# Patient Record
Sex: MALE | Race: WHITE | Employment: UNEMPLOYED | URBAN - METROPOLITAN AREA
[De-identification: names, ages, dates, MRNs, and addresses within clinical notes are randomized per-mention and may not be internally consistent; named-entity substitution may affect disease eponyms.]

---

## 2017-09-19 ENCOUNTER — APPOINTMENT (OUTPATIENT)
Dept: GENERAL RADIOLOGY | Age: 27
End: 2017-09-19
Payer: COMMERCIAL

## 2017-09-19 ENCOUNTER — HOSPITAL ENCOUNTER (EMERGENCY)
Age: 27
Discharge: HOME | End: 2017-09-19
Payer: COMMERCIAL

## 2017-09-19 VITALS
RESPIRATION RATE: 18 BRPM | HEART RATE: 78 BPM | TEMPERATURE: 98.9 F | WEIGHT: 145 LBS | DIASTOLIC BLOOD PRESSURE: 66 MMHG | HEIGHT: 69 IN | OXYGEN SATURATION: 99 % | SYSTOLIC BLOOD PRESSURE: 120 MMHG | BODY MASS INDEX: 21.48 KG/M2

## 2017-09-19 DIAGNOSIS — K62.5 RECTAL BLEEDING: Primary | ICD-10-CM

## 2017-09-19 LAB
ALBUMIN SERPL-MCNC: 4.2 G/DL (ref 3.9–4.9)
ALP BLD-CCNC: 214 U/L (ref 35–104)
ALT SERPL-CCNC: 38 U/L (ref 0–41)
ANION GAP SERPL CALCULATED.3IONS-SCNC: 12 MEQ/L (ref 7–13)
APTT: 28.4 SEC (ref 21.6–35.4)
AST SERPL-CCNC: 28 U/L (ref 0–40)
BASOPHILS ABSOLUTE: 0.1 K/UL (ref 0–0.2)
BASOPHILS RELATIVE PERCENT: 1.3 %
BILIRUB SERPL-MCNC: 0.2 MG/DL (ref 0–1.2)
BUN BLDV-MCNC: 14 MG/DL (ref 6–20)
CALCIUM SERPL-MCNC: 9.1 MG/DL (ref 8.6–10.2)
CHLORIDE BLD-SCNC: 98 MEQ/L (ref 98–107)
CO2: 27 MEQ/L (ref 22–29)
CREAT SERPL-MCNC: 0.71 MG/DL (ref 0.7–1.2)
EOSINOPHILS ABSOLUTE: 0.1 K/UL (ref 0–0.7)
EOSINOPHILS RELATIVE PERCENT: 2 %
GFR AFRICAN AMERICAN: >60
GFR NON-AFRICAN AMERICAN: >60
GLOBULIN: 3.5 G/DL (ref 2.3–3.5)
GLUCOSE BLD-MCNC: 95 MG/DL (ref 74–109)
HCT VFR BLD CALC: 38.7 % (ref 42–52)
HEMOGLOBIN: 12.8 G/DL (ref 14–18)
INR BLD: 1
LYMPHOCYTES ABSOLUTE: 1.1 K/UL (ref 1–4.8)
LYMPHOCYTES RELATIVE PERCENT: 16.1 %
MCH RBC QN AUTO: 26.3 PG (ref 27–31.3)
MCHC RBC AUTO-ENTMCNC: 33.2 % (ref 33–37)
MCV RBC AUTO: 79.2 FL (ref 80–100)
MONOCYTES ABSOLUTE: 0.5 K/UL (ref 0.2–0.8)
MONOCYTES RELATIVE PERCENT: 7.5 %
NEUTROPHILS ABSOLUTE: 4.9 K/UL (ref 1.4–6.5)
NEUTROPHILS RELATIVE PERCENT: 73.1 %
PDW BLD-RTO: 14.3 % (ref 11.5–14.5)
PLATELET # BLD: 303 K/UL (ref 130–400)
POTASSIUM SERPL-SCNC: 4.7 MEQ/L (ref 3.5–5.1)
PROTHROMBIN TIME: 10.3 SEC (ref 8.1–13.7)
RBC # BLD: 4.88 M/UL (ref 4.7–6.1)
SODIUM BLD-SCNC: 137 MEQ/L (ref 132–144)
TOTAL PROTEIN: 7.7 G/DL (ref 6.4–8.1)
WBC # BLD: 6.7 K/UL (ref 4.8–10.8)

## 2017-09-19 PROCEDURE — S0028 INJECTION, FAMOTIDINE, 20 MG: HCPCS | Performed by: PHYSICIAN ASSISTANT

## 2017-09-19 PROCEDURE — 85025 COMPLETE CBC W/AUTO DIFF WBC: CPT

## 2017-09-19 PROCEDURE — C9113 INJ PANTOPRAZOLE SODIUM, VIA: HCPCS | Performed by: PHYSICIAN ASSISTANT

## 2017-09-19 PROCEDURE — 80053 COMPREHEN METABOLIC PANEL: CPT

## 2017-09-19 PROCEDURE — 74000 XR ABDOMEN LIMITED (KUB): CPT

## 2017-09-19 PROCEDURE — 85730 THROMBOPLASTIN TIME PARTIAL: CPT

## 2017-09-19 PROCEDURE — 96374 THER/PROPH/DIAG INJ IV PUSH: CPT

## 2017-09-19 PROCEDURE — 99283 EMERGENCY DEPT VISIT LOW MDM: CPT

## 2017-09-19 PROCEDURE — 2580000003 HC RX 258: Performed by: PHYSICIAN ASSISTANT

## 2017-09-19 PROCEDURE — 85610 PROTHROMBIN TIME: CPT

## 2017-09-19 PROCEDURE — 2500000003 HC RX 250 WO HCPCS: Performed by: PHYSICIAN ASSISTANT

## 2017-09-19 PROCEDURE — 36415 COLL VENOUS BLD VENIPUNCTURE: CPT

## 2017-09-19 PROCEDURE — 96375 TX/PRO/DX INJ NEW DRUG ADDON: CPT

## 2017-09-19 PROCEDURE — 6360000002 HC RX W HCPCS: Performed by: PHYSICIAN ASSISTANT

## 2017-09-19 RX ORDER — TRAZODONE HYDROCHLORIDE 100 MG/1
50 TABLET ORAL NIGHTLY
COMMUNITY

## 2017-09-19 RX ORDER — LISINOPRIL 10 MG/1
10 TABLET ORAL DAILY
COMMUNITY

## 2017-09-19 RX ORDER — FEXOFENADINE HCL 180 MG/1
180 TABLET ORAL DAILY
COMMUNITY

## 2017-09-19 RX ORDER — FELBAMATE 400 MG/1
400 TABLET ORAL 3 TIMES DAILY
COMMUNITY

## 2017-09-19 RX ORDER — 0.9 % SODIUM CHLORIDE 0.9 %
10 VIAL (ML) INJECTION ONCE
Status: COMPLETED | OUTPATIENT
Start: 2017-09-19 | End: 2017-09-19

## 2017-09-19 RX ORDER — PANTOPRAZOLE SODIUM 40 MG/10ML
40 INJECTION, POWDER, LYOPHILIZED, FOR SOLUTION INTRAVENOUS ONCE
Status: COMPLETED | OUTPATIENT
Start: 2017-09-19 | End: 2017-09-19

## 2017-09-19 RX ORDER — ZIPRASIDONE HYDROCHLORIDE 80 MG/1
80 CAPSULE ORAL 2 TIMES DAILY WITH MEALS
COMMUNITY

## 2017-09-19 RX ORDER — LACOSAMIDE 150 MG/1
150 TABLET ORAL 2 TIMES DAILY
COMMUNITY

## 2017-09-19 RX ORDER — 0.9 % SODIUM CHLORIDE 0.9 %
1000 INTRAVENOUS SOLUTION INTRAVENOUS ONCE
Status: COMPLETED | OUTPATIENT
Start: 2017-09-19 | End: 2017-09-19

## 2017-09-19 RX ADMIN — SODIUM CHLORIDE, PRESERVATIVE FREE 10 ML: 5 INJECTION INTRAVENOUS at 14:35

## 2017-09-19 RX ADMIN — FAMOTIDINE 20 MG: 10 INJECTION, SOLUTION INTRAVENOUS at 14:35

## 2017-09-19 RX ADMIN — SODIUM CHLORIDE 1000 ML: 9 INJECTION, SOLUTION INTRAVENOUS at 14:36

## 2017-09-19 RX ADMIN — PANTOPRAZOLE SODIUM 40 MG: 40 INJECTION, POWDER, FOR SOLUTION INTRAVENOUS at 14:35

## 2017-09-19 ASSESSMENT — ENCOUNTER SYMPTOMS
BLOOD IN STOOL: 1
SHORTNESS OF BREATH: 0
COUGH: 0
DIARRHEA: 0
NAUSEA: 0
VOMITING: 0
ABDOMINAL PAIN: 0

## 2017-10-24 ENCOUNTER — APPOINTMENT (OUTPATIENT)
Dept: CT IMAGING | Age: 27
End: 2017-10-24
Payer: COMMERCIAL

## 2017-10-24 ENCOUNTER — HOSPITAL ENCOUNTER (EMERGENCY)
Age: 27
Discharge: HOME | End: 2017-10-24
Payer: COMMERCIAL

## 2017-10-24 VITALS
RESPIRATION RATE: 18 BRPM | TEMPERATURE: 98.5 F | OXYGEN SATURATION: 95 % | HEART RATE: 80 BPM | HEIGHT: 69 IN | WEIGHT: 147 LBS | DIASTOLIC BLOOD PRESSURE: 83 MMHG | SYSTOLIC BLOOD PRESSURE: 132 MMHG | BODY MASS INDEX: 21.77 KG/M2

## 2017-10-24 DIAGNOSIS — S01.01XA LACERATION OF SCALP, INITIAL ENCOUNTER: Primary | ICD-10-CM

## 2017-10-24 DIAGNOSIS — R56.9 SEIZURE (HCC): ICD-10-CM

## 2017-10-24 DIAGNOSIS — S09.90XA CLOSED HEAD INJURY, INITIAL ENCOUNTER: ICD-10-CM

## 2017-10-24 PROCEDURE — 12002 RPR S/N/AX/GEN/TRNK2.6-7.5CM: CPT

## 2017-10-24 PROCEDURE — 70450 CT HEAD/BRAIN W/O DYE: CPT

## 2017-10-24 PROCEDURE — 99284 EMERGENCY DEPT VISIT MOD MDM: CPT

## 2017-10-24 PROCEDURE — 72125 CT NECK SPINE W/O DYE: CPT

## 2017-10-24 RX ORDER — PANTOPRAZOLE SODIUM 20 MG/1
40 TABLET, DELAYED RELEASE ORAL DAILY
COMMUNITY

## 2017-10-24 RX ORDER — EVEROLIMUS 10 MG/1
10 TABLET ORAL DAILY
COMMUNITY

## 2017-10-24 ASSESSMENT — ENCOUNTER SYMPTOMS
BLOOD IN STOOL: 0
RHINORRHEA: 0
ABDOMINAL PAIN: 0
SHORTNESS OF BREATH: 0
DIARRHEA: 0
COLOR CHANGE: 0
NAUSEA: 0
VOMITING: 0
COUGH: 0

## 2017-10-24 NOTE — ED TRIAGE NOTES
Father states pt had a seizure falling and hitting the back of his head causing a approx 3cm laceration to the back of his head bleeding controlled, pt is alert, breathes are equal and unlabored, no distress noted,

## 2017-10-24 NOTE — ED PROVIDER NOTES
[facility])          SURGICAL HISTORY       Past Surgical History:   Procedure Laterality Date    CIRCUMCISION      FACIAL COSMETIC SURGERY      FOREIGN BODY REMOVAL      from nose         CURRENT MEDICATIONS       Previous Medications    EVEROLIMUS (AFINITOR) 10 MG TABS CHEMO TABLET    Take 10 mg by mouth daily    FELBAMATE (FELBATOL) 400 MG TABLET    Take 400 mg by mouth 3 times daily    FEXOFENADINE (ALLEGRA ALLERGY) 180 MG TABLET    Take 180 mg by mouth daily    LACOSAMIDE (VIMPAT) 150 MG TABS TABLET    Take 150 mg by mouth 2 times daily    LAMOTRIGINE (LAMICTAL PO)    Take 300 mg by mouth nightly    LISDEXAMFETAMINE (VYVANSE) 30 MG CAPSULE    Take 30 mg by mouth every morning . LISINOPRIL (PRINIVIL;ZESTRIL) 10 MG TABLET    Take 10 mg by mouth daily    PANTOPRAZOLE (PROTONIX) 20 MG TABLET    Take 40 mg by mouth daily    TRAZODONE (DESYREL) 100 MG TABLET    Take 50 mg by mouth nightly     ZIPRASIDONE (GEODON) 80 MG CAPSULE    Take 80 mg by mouth 2 times daily (with meals)       ALLERGIES     Review of patient's allergies indicates no known allergies. FAMILY HISTORY     History reviewed. No pertinent family history. SOCIAL HISTORY       Social History     Social History    Marital status: Single     Spouse name: N/A    Number of children: N/A    Years of education: N/A     Social History Main Topics    Smoking status: Never Smoker    Smokeless tobacco: Never Used    Alcohol use No    Drug use: No    Sexual activity: Not Asked     Other Topics Concern    None     Social History Narrative    None         PHYSICAL EXAM         ED Triage Vitals [10/24/17 1838]   BP Temp Temp Source Pulse Resp SpO2 Height Weight   (!) 142/86 98.5 °F (36.9 °C) Temporal 102 18 96 % 5' 9\" (1.753 m) 147 lb (66.7 kg)       Physical Exam   Constitutional: He appears well-developed and well-nourished. HENT:   Head: Normocephalic.        Right Ear: External ear normal.   Left Ear: External ear normal.   Mouth/Throat: Oropharynx is clear and moist.   Eyes: Conjunctivae are normal. Pupils are equal, round, and reactive to light. Neck: Normal range of motion. Neck supple. No tracheal deviation present. Cardiovascular: Normal heart sounds and intact distal pulses. Pulmonary/Chest: Effort normal and breath sounds normal. No stridor. No respiratory distress. Abdominal: Soft. Bowel sounds are normal. He exhibits no distension and no mass. There is no tenderness. There is no rebound and no guarding. Musculoskeletal: Normal range of motion. Neurological: He is alert. He has normal reflexes. Pt does not follow commands but caregiver states normal. Pt alert looking around room with PERRL. No nystagmus. PORTILLO voluntarily well without difficulty   Skin: Skin is warm and dry. No rash noted. Psychiatric: He has a normal mood and affect. His behavior is normal. Judgment and thought content normal.       DIAGNOSTIC RESULTS     EKG: All EKG's are interpreted by the Emergency Department Physician who either signs or Co-signs this chart in the absence of a cardiologist.        RADIOLOGY:   Non-plain film images such as CT, Ultrasound and MRI are read by the radiologist. Plain radiographic images are visualized and preliminarily interpreted by the emergency physician with the below findings:    Interpretation per the Radiologist below, if available at the time of this note:    CT CERVICAL SPINE WO CONTRAST   Final Result   NEGATIVE CT SCAN OF [address] [place] [address]. All CT scans at this facility use dose modulation, iterative reconstruction, and/or weight based dosing when appropriate to reduce radiation dose to as low as reasonably achievable. CT Head WO Contrast   Final Result   1. CT FINDINGS OF TUBEROUS SCLEROSIS APPEAR UNCHANGED SINCE PRIOR CT BRAIN SCANS. 2. NO ACUTE INTRA-AXIAL OR EXTRA-AXIAL FINDINGS IN THE BRAIN. 3. NO SIGNIFICANT CHANGE SINCE THE 12/4/2012 CT BRAIN.       All CT scans at this facility use dose modulation, iterative reconstruction, and/or weight based dosing when appropriate to reduce radiation dose to as low as reasonably achievable. LABS:  Labs Reviewed - No data to display    All other labs were within normal range or not returned as of this dictation. EMERGENCY DEPARTMENT COURSE and DIFFERENTIAL DIAGNOSIS/MDM:   Vitals:    Vitals:    10/24/17 1838 10/24/17 2050   BP: (!) 142/86 132/83   Pulse: 102 80   Resp: 18 18   Temp: 98.5 °F (36.9 °C)    TempSrc: Temporal    SpO2: 96% 95%   Weight: 147 lb (66.7 kg)    Height: 5' 9\" (1.753 m)          MDM    CT of the head and neck show no acute process. Patient tolerated staples well. Patient has no neurological deficits. Mom and caregiver are at bedside and aware of closed head injury signs to watch for at home and when to return patient to ER. Standard anticipatory guidance given to patient upon discharge. Have given them a specific time frame in which to follow-up and who to follow-up with. I have also advised them that they should return to the emergency department if they get worse, or not getting better or develop any new or concerning symptoms. Patient demonstrates understanding. CRITICAL CARE TIME   Total Critical Care time was 0 minutes, excluding separately reportable procedures. There was a high probability of clinically significant/life threatening deterioration in the patient's condition which required my urgent intervention. [facility] [employer]  Date/Time: 10/24/2017 9:24 PM  Performed by: Rob Hopkins  Authorized by: Rob Hopkins     Consent:     Consent obtained:  Verbal    Consent given by:  Parent    Risks discussed:  Pain, infection, poor cosmetic result and poor wound healing  Anesthesia (see MAR for exact dosages):      Anesthesia method:  None  Laceration details:     Location:  Scalp    Scalp location:  L parietal    Length (cm):  3  Repair type:     Repair type:  Simple  Pre-procedure details:     Preparation: